# Patient Record
Sex: FEMALE | Race: WHITE | NOT HISPANIC OR LATINO
[De-identification: names, ages, dates, MRNs, and addresses within clinical notes are randomized per-mention and may not be internally consistent; named-entity substitution may affect disease eponyms.]

---

## 2017-01-04 ENCOUNTER — TRANSCRIPTION ENCOUNTER (OUTPATIENT)
Age: 27
End: 2017-01-04

## 2017-01-04 ENCOUNTER — OTHER (OUTPATIENT)
Age: 27
End: 2017-01-04

## 2018-01-09 ENCOUNTER — APPOINTMENT (OUTPATIENT)
Dept: INTERNAL MEDICINE | Facility: CLINIC | Age: 28
End: 2018-01-09

## 2018-01-16 ENCOUNTER — APPOINTMENT (OUTPATIENT)
Dept: INTERNAL MEDICINE | Facility: CLINIC | Age: 28
End: 2018-01-16
Payer: COMMERCIAL

## 2018-01-16 VITALS
DIASTOLIC BLOOD PRESSURE: 86 MMHG | BODY MASS INDEX: 21.14 KG/M2 | WEIGHT: 112 LBS | HEART RATE: 87 BPM | TEMPERATURE: 98.4 F | HEIGHT: 61 IN | SYSTOLIC BLOOD PRESSURE: 120 MMHG | OXYGEN SATURATION: 96 %

## 2018-01-16 PROCEDURE — 99214 OFFICE O/P EST MOD 30 MIN: CPT

## 2018-01-19 ENCOUNTER — OTHER (OUTPATIENT)
Age: 28
End: 2018-01-19

## 2018-04-10 ENCOUNTER — APPOINTMENT (OUTPATIENT)
Dept: INTERNAL MEDICINE | Facility: CLINIC | Age: 28
End: 2018-04-10
Payer: COMMERCIAL

## 2018-04-10 VITALS
SYSTOLIC BLOOD PRESSURE: 123 MMHG | HEART RATE: 85 BPM | WEIGHT: 110 LBS | DIASTOLIC BLOOD PRESSURE: 78 MMHG | BODY MASS INDEX: 20.77 KG/M2 | HEIGHT: 61 IN | OXYGEN SATURATION: 98 % | TEMPERATURE: 99 F

## 2018-04-10 LAB
BASOPHILS # BLD AUTO: 0.02 K/UL
BASOPHILS NFR BLD AUTO: 0.5 %
EOSINOPHIL # BLD AUTO: 0.05 K/UL
EOSINOPHIL NFR BLD AUTO: 1.3 %
HCT VFR BLD CALC: 37.9 %
HGB BLD-MCNC: 13 G/DL
IMM GRANULOCYTES NFR BLD AUTO: 0 %
LYMPHOCYTES # BLD AUTO: 1.28 K/UL
LYMPHOCYTES NFR BLD AUTO: 32.3 %
MAN DIFF?: NORMAL
MCHC RBC-ENTMCNC: 29.7 PG
MCHC RBC-ENTMCNC: 34.3 GM/DL
MCV RBC AUTO: 86.5 FL
MONOCYTES # BLD AUTO: 0.39 K/UL
MONOCYTES NFR BLD AUTO: 9.8 %
NEUTROPHILS # BLD AUTO: 2.22 K/UL
NEUTROPHILS NFR BLD AUTO: 56.1 %
PLATELET # BLD AUTO: 207 K/UL
RBC # BLD: 4.38 M/UL
RBC # FLD: 12.9 %
WBC # FLD AUTO: 3.96 K/UL

## 2018-04-10 PROCEDURE — 99395 PREV VISIT EST AGE 18-39: CPT | Mod: 25

## 2018-04-10 PROCEDURE — 36415 COLL VENOUS BLD VENIPUNCTURE: CPT

## 2018-04-10 RX ORDER — HYDROCODONE BITARTRATE AND HOMATROPINE METHYLBROMIDE 5; 1.5 MG/5ML; MG/5ML
5-1.5 SYRUP ORAL
Qty: 1 | Refills: 0 | Status: DISCONTINUED | COMMUNITY
Start: 2018-01-16 | End: 2018-04-10

## 2018-04-10 RX ORDER — AZITHROMYCIN 250 MG/1
250 TABLET, FILM COATED ORAL
Qty: 6 | Refills: 0 | Status: DISCONTINUED | COMMUNITY
Start: 2018-01-16 | End: 2018-04-10

## 2018-04-11 LAB
25(OH)D3 SERPL-MCNC: 14.2 NG/ML
ALBUMIN SERPL ELPH-MCNC: 4.7 G/DL
ALP BLD-CCNC: 46 U/L
ALT SERPL-CCNC: 11 U/L
ANION GAP SERPL CALC-SCNC: 15 MMOL/L
AST SERPL-CCNC: 20 U/L
BILIRUB SERPL-MCNC: 1 MG/DL
BUN SERPL-MCNC: 11 MG/DL
CALCIUM SERPL-MCNC: 9.7 MG/DL
CHLORIDE SERPL-SCNC: 101 MMOL/L
CHOLEST SERPL-MCNC: 175 MG/DL
CHOLEST/HDLC SERPL: 2.8 RATIO
CO2 SERPL-SCNC: 23 MMOL/L
CREAT SERPL-MCNC: 0.75 MG/DL
FOLATE SERPL-MCNC: 16.7 NG/ML
GLUCOSE SERPL-MCNC: 92 MG/DL
HCV AB SER QL: NONREACTIVE
HCV S/CO RATIO: 0.09 S/CO
HDLC SERPL-MCNC: 62 MG/DL
HIV1+2 AB SPEC QL IA.RAPID: NONREACTIVE
LDLC SERPL CALC-MCNC: 104 MG/DL
POTASSIUM SERPL-SCNC: 4.2 MMOL/L
PROT SERPL-MCNC: 7.4 G/DL
SODIUM SERPL-SCNC: 139 MMOL/L
TRIGL SERPL-MCNC: 44 MG/DL
TSH SERPL-ACNC: 2.66 UIU/ML
VIT B12 SERPL-MCNC: 246 PG/ML

## 2018-05-08 ENCOUNTER — RESULT REVIEW (OUTPATIENT)
Age: 28
End: 2018-05-08

## 2018-10-31 ENCOUNTER — APPOINTMENT (OUTPATIENT)
Dept: INTERNAL MEDICINE | Facility: CLINIC | Age: 28
End: 2018-10-31
Payer: COMMERCIAL

## 2018-10-31 VITALS
HEART RATE: 81 BPM | DIASTOLIC BLOOD PRESSURE: 81 MMHG | HEIGHT: 61 IN | TEMPERATURE: 98.1 F | WEIGHT: 116 LBS | OXYGEN SATURATION: 97 % | BODY MASS INDEX: 21.9 KG/M2 | SYSTOLIC BLOOD PRESSURE: 121 MMHG

## 2018-10-31 DIAGNOSIS — D73.9 DISEASE OF SPLEEN, UNSPECIFIED: ICD-10-CM

## 2018-10-31 DIAGNOSIS — G43.909 MIGRAINE, UNSPECIFIED, NOT INTRACTABLE, W/OUT STATUS MIGRAINOSUS: ICD-10-CM

## 2018-10-31 PROCEDURE — 99214 OFFICE O/P EST MOD 30 MIN: CPT

## 2018-10-31 NOTE — HISTORY OF PRESENT ILLNESS
[de-identified] : 28 yr old last seen in spring, complaining of headaches. She has them on and off, had a severe one a week ago with aura, light sensitivity and vomiting. 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 Lengthy URI over winter. Wondering about when to plan pregnancies and needs new OB/GYN.  Working hard, Feels good.

## 2019-02-04 ENCOUNTER — APPOINTMENT (OUTPATIENT)
Dept: INTERNAL MEDICINE | Facility: CLINIC | Age: 29
End: 2019-02-04
Payer: COMMERCIAL

## 2019-02-04 VITALS
HEIGHT: 61 IN | TEMPERATURE: 97.8 F | SYSTOLIC BLOOD PRESSURE: 110 MMHG | HEART RATE: 112 BPM | DIASTOLIC BLOOD PRESSURE: 72 MMHG | OXYGEN SATURATION: 97 % | BODY MASS INDEX: 21.17 KG/M2 | WEIGHT: 112.13 LBS

## 2019-02-04 DIAGNOSIS — J06.9 ACUTE UPPER RESPIRATORY INFECTION, UNSPECIFIED: ICD-10-CM

## 2019-02-04 DIAGNOSIS — R53.81 OTHER MALAISE: ICD-10-CM

## 2019-02-04 DIAGNOSIS — R10.9 UNSPECIFIED ABDOMINAL PAIN: ICD-10-CM

## 2019-02-04 DIAGNOSIS — J20.8 ACUTE BRONCHITIS DUE TO OTHER SPECIFIED ORGANISMS: ICD-10-CM

## 2019-02-04 DIAGNOSIS — R21 RASH AND OTHER NONSPECIFIC SKIN ERUPTION: ICD-10-CM

## 2019-02-04 DIAGNOSIS — Z87.898 PERSONAL HISTORY OF OTHER SPECIFIED CONDITIONS: ICD-10-CM

## 2019-02-04 DIAGNOSIS — R53.83 OTHER MALAISE: ICD-10-CM

## 2019-02-04 LAB
FLUAV SPEC QL CULT: NORMAL
FLUBV AG SPEC QL IA: NORMAL
S PYO AG SPEC QL IA: NORMAL

## 2019-02-04 PROCEDURE — 87804 INFLUENZA ASSAY W/OPTIC: CPT | Mod: QW

## 2019-02-04 PROCEDURE — 87880 STREP A ASSAY W/OPTIC: CPT | Mod: QW

## 2019-02-04 PROCEDURE — 99214 OFFICE O/P EST MOD 30 MIN: CPT | Mod: 25

## 2019-02-04 RX ORDER — PRENATAL VIT/IRON FUM/FOLIC AC 28MG-0.8MG
28-0.8 TABLET ORAL DAILY
Qty: 90 | Refills: 3 | Status: COMPLETED | OUTPATIENT
Start: 2017-01-04 | End: 2019-02-04

## 2019-02-04 NOTE — ASSESSMENT
[FreeTextEntry1] : 29 yO female is here with a viral upper respiratory syndrome.\par Symptomatic relief advised. \par Cheratussin prescribed. [place].\par NO indication for ABX at this time.

## 2019-02-04 NOTE — PHYSICAL EXAM
[No Acute Distress] : no acute distress [Well Nourished] : well nourished [Well Developed] : well developed [Well-Appearing] : well-appearing [Normal Voice/Communication] : normal voice/communication [Normal Sclera/Conjunctiva] : normal sclera/conjunctiva [PERRL] : pupils equal round and reactive to light [Normal Outer Ear/Nose] : the outer ears and nose were normal in appearance [Normal Oropharynx] : the oropharynx was normal [Normal TMs] : both tympanic membranes were normal [Normal Nasal Mucosa] : the nasal mucosa was normal [No Lymphadenopathy] : no lymphadenopathy [Thyroid Normal, No Nodules] : the thyroid was normal and there were no nodules present [No Respiratory Distress] : no respiratory distress  [Clear to Auscultation] : lungs were clear to auscultation bilaterally [No Accessory Muscle Use] : no accessory muscle use [Normal Rate] : normal rate  [No Edema] : there was no peripheral edema [Normal Supraclavicular Nodes] : no supraclavicular lymphadenopathy [Normal Posterior Cervical Nodes] : no posterior cervical lymphadenopathy [Normal Anterior Cervical Nodes] : no anterior cervical lymphadenopathy [Normal Affect] : the affect was normal [Alert and Oriented x3] : oriented to person, place, and time

## 2019-02-04 NOTE — HISTORY OF PRESENT ILLNESS
[FreeTextEntry8] : 27 y/o female, pt of Dr Arana, is here for 24 hours of upper respiratory symptoms. Last week, she had one day of malaise but got better and was fine ovre the weekend. Then, last night after the super bowl. she had severe pharyngitis and didn't sleep well.\par This morning, she had nasal congestion.\par She had chills but no fever-Tm 98.8. SHe has developed back ache and myalgias. She has not taken anything for symptoms relief. \par

## 2019-02-04 NOTE — REVIEW OF SYSTEMS
[Chills] : chills [Nasal Discharge] : nasal discharge [Sore Throat] : sore throat [Postnasal Drip] : postnasal drip [Cough] : cough [Muscle Pain] : muscle pain [Negative] : Gastrointestinal [Fever] : no fever [Night Sweats] : no night sweats [Earache] : no earache [Hearing Loss] : no hearing loss [Nosebleed] : no nosebleeds [Hoarseness] : no hoarseness [Dyspnea on Exertion] : no dyspnea on exertion [Headache] : no headache [Swollen Glands] : no swollen glands

## 2019-05-08 ENCOUNTER — TRANSCRIPTION ENCOUNTER (OUTPATIENT)
Age: 29
End: 2019-05-08

## 2019-07-16 ENCOUNTER — RESULT REVIEW (OUTPATIENT)
Age: 29
End: 2019-07-16

## 2019-07-28 RX ORDER — CODEINE PHOSPHATE AND GUAIFENESIN 10; 100 MG/5ML; MG/5ML
100-10 LIQUID ORAL EVERY 8 HOURS
Qty: 1 | Refills: 0 | Status: DISCONTINUED | COMMUNITY
Start: 2019-02-04 | End: 2019-07-28

## 2019-07-28 RX ORDER — SUMATRIPTAN 25 MG/1
25 TABLET, FILM COATED ORAL
Qty: 10 | Refills: 5 | Status: DISCONTINUED | COMMUNITY
Start: 2018-10-31 | End: 2019-07-28

## 2019-07-28 NOTE — PHYSICAL EXAM
[No Acute Distress] : no acute distress [Well Nourished] : well nourished [Well-Appearing] : well-appearing [Well Developed] : well developed [Normal Sclera/Conjunctiva] : normal sclera/conjunctiva [EOMI] : extraocular movements intact [PERRL] : pupils equal round and reactive to light [No JVD] : no jugular venous distention [Normal Oropharynx] : the oropharynx was normal [Normal Outer Ear/Nose] : the outer ears and nose were normal in appearance [Thyroid Normal, No Nodules] : the thyroid was normal and there were no nodules present [Supple] : supple [No Lymphadenopathy] : no lymphadenopathy [No Accessory Muscle Use] : no accessory muscle use [Clear to Auscultation] : lungs were clear to auscultation bilaterally [No Respiratory Distress] : no respiratory distress  [Normal Rate] : normal rate  [Regular Rhythm] : with a regular rhythm [Normal S1, S2] : normal S1 and S2 [No Murmur] : no murmur heard [No Carotid Bruits] : no carotid bruits [No Abdominal Bruit] : a ~M bruit was not heard ~T in the abdomen [No Varicosities] : no varicosities [No Edema] : there was no peripheral edema [Pedal Pulses Present] : the pedal pulses are present [Soft] : abdomen soft [No Palpable Aorta] : no palpable aorta [No Extremity Clubbing/Cyanosis] : no extremity clubbing/cyanosis [No Masses] : no abdominal mass palpated [Non Tender] : non-tender [Non-distended] : non-distended [Normal Posterior Cervical Nodes] : no posterior cervical lymphadenopathy [No HSM] : no HSM [Normal Bowel Sounds] : normal bowel sounds [Normal Anterior Cervical Nodes] : no anterior cervical lymphadenopathy [No Spinal Tenderness] : no spinal tenderness [No CVA Tenderness] : no CVA  tenderness [No Rash] : no rash [Coordination Grossly Intact] : coordination grossly intact [No Joint Swelling] : no joint swelling [Grossly Normal Strength/Tone] : grossly normal strength/tone [Normal Gait] : normal gait [Deep Tendon Reflexes (DTR)] : deep tendon reflexes were 2+ and symmetric [No Focal Deficits] : no focal deficits [Normal Affect] : the affect was normal [Normal Insight/Judgement] : insight and judgment were intact

## 2019-07-29 ENCOUNTER — APPOINTMENT (OUTPATIENT)
Dept: INTERNAL MEDICINE | Facility: CLINIC | Age: 29
End: 2019-07-29
Payer: SELF-PAY

## 2019-07-29 VITALS
BODY MASS INDEX: 21.14 KG/M2 | HEIGHT: 61 IN | WEIGHT: 112 LBS | TEMPERATURE: 99.3 F | HEART RATE: 59 BPM | OXYGEN SATURATION: 98 % | SYSTOLIC BLOOD PRESSURE: 110 MMHG | DIASTOLIC BLOOD PRESSURE: 84 MMHG

## 2019-07-29 DIAGNOSIS — Z23 ENCOUNTER FOR IMMUNIZATION: ICD-10-CM

## 2019-07-29 PROCEDURE — 99402 PREV MED CNSL INDIV APPRX 30: CPT | Mod: 25

## 2019-07-29 PROCEDURE — 99213 OFFICE O/P EST LOW 20 MIN: CPT | Mod: 25

## 2019-07-29 PROCEDURE — 90471 IMMUNIZATION ADMIN: CPT

## 2019-07-29 PROCEDURE — 90632 HEPA VACCINE ADULT IM: CPT

## 2019-07-29 NOTE — ASSESSMENT
[FreeTextEntry1] : 28 yr old woman who has traveled to [place] and [place] before, now traveling to [place] and [place] on holiday with her .  Familiar with malarial prophylaxis and has tolerated doxy before.  \par \par Advised on safe food/ water / hygiene practices during travel/ sunscreen etc. \par Advised on taking Malarone\par \par Advised on when to take cipro/ prn should she develop diarrhea\par Declines typhoid vaccine, will take oral Typhim\par \par RTC 6 mo for repeat hep A vaccine\par CPE in interim, may check immunity then

## 2019-07-29 NOTE — HISTORY OF PRESENT ILLNESS
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

## 2019-08-01 ENCOUNTER — TRANSCRIPTION ENCOUNTER (OUTPATIENT)
Age: 29
End: 2019-08-01

## 2020-07-21 ENCOUNTER — RESULT REVIEW (OUTPATIENT)
Age: 30
End: 2020-07-21

## 2020-12-08 ENCOUNTER — APPOINTMENT (OUTPATIENT)
Dept: INTERNAL MEDICINE | Facility: CLINIC | Age: 30
End: 2020-12-08

## 2020-12-09 ENCOUNTER — APPOINTMENT (OUTPATIENT)
Dept: INTERNAL MEDICINE | Facility: CLINIC | Age: 30
End: 2020-12-09
Payer: COMMERCIAL

## 2020-12-09 VITALS
SYSTOLIC BLOOD PRESSURE: 121 MMHG | DIASTOLIC BLOOD PRESSURE: 75 MMHG | HEIGHT: 61 IN | WEIGHT: 115 LBS | BODY MASS INDEX: 21.71 KG/M2 | TEMPERATURE: 97.5 F | OXYGEN SATURATION: 98 % | HEART RATE: 78 BPM

## 2020-12-09 PROCEDURE — 99072 ADDL SUPL MATRL&STAF TM PHE: CPT

## 2020-12-09 PROCEDURE — 99213 OFFICE O/P EST LOW 20 MIN: CPT

## 2020-12-21 PROBLEM — J06.9 ACUTE UPPER RESPIRATORY INFECTION: Status: RESOLVED | Noted: 2019-02-04 | Resolved: 2020-12-21

## 2020-12-28 ENCOUNTER — TRANSCRIPTION ENCOUNTER (OUTPATIENT)
Age: 30
End: 2020-12-28

## 2020-12-29 ENCOUNTER — APPOINTMENT (OUTPATIENT)
Dept: INTERNAL MEDICINE | Facility: CLINIC | Age: 30
End: 2020-12-29
Payer: COMMERCIAL

## 2020-12-29 VITALS
DIASTOLIC BLOOD PRESSURE: 74 MMHG | SYSTOLIC BLOOD PRESSURE: 122 MMHG | TEMPERATURE: 98.5 F | WEIGHT: 115 LBS | HEART RATE: 81 BPM | HEIGHT: 61 IN | OXYGEN SATURATION: 97 % | BODY MASS INDEX: 21.71 KG/M2

## 2020-12-29 DIAGNOSIS — Z71.84 ENC FOR HEALTH COUNSELING RELATED TO TRAVEL: ICD-10-CM

## 2020-12-29 DIAGNOSIS — J03.90 ACUTE TONSILLITIS, UNSPECIFIED: ICD-10-CM

## 2020-12-29 DIAGNOSIS — J02.9 ACUTE TONSILLITIS, UNSPECIFIED: ICD-10-CM

## 2020-12-29 DIAGNOSIS — I88.9 NONSPECIFIC LYMPHADENITIS, UNSPECIFIED: ICD-10-CM

## 2020-12-29 PROCEDURE — 99214 OFFICE O/P EST MOD 30 MIN: CPT | Mod: 25

## 2020-12-29 PROCEDURE — 36415 COLL VENOUS BLD VENIPUNCTURE: CPT

## 2020-12-29 PROCEDURE — 99072 ADDL SUPL MATRL&STAF TM PHE: CPT

## 2020-12-29 RX ORDER — CIPROFLOXACIN HYDROCHLORIDE 500 MG/1
500 TABLET, FILM COATED ORAL
Qty: 20 | Refills: 0 | Status: COMPLETED | COMMUNITY
Start: 2019-07-29 | End: 2020-12-29

## 2020-12-29 RX ORDER — ATOVAQUONE AND PROGUANIL HYDROCHLORIDE 250; 100 MG/1; MG/1
250-100 TABLET, FILM COATED ORAL DAILY
Qty: 30 | Refills: 0 | Status: COMPLETED | COMMUNITY
Start: 2019-07-29 | End: 2020-12-29

## 2020-12-29 RX ORDER — CLARITHROMYCIN 500 MG/1
500 TABLET, FILM COATED ORAL
Qty: 14 | Refills: 0 | Status: COMPLETED | COMMUNITY
Start: 2020-12-09 | End: 2020-12-29

## 2020-12-29 NOTE — HISTORY OF PRESENT ILLNESS
[FreeTextEntry8] : 31 y/o female, patient of Dr Mendez's, presents with a "lump" in her right neck. SHe feels pulling there when she swallows and it's slightly tender. It is hard and hasn't gone away.\par Pt saw Dr Mendez at the beginning of the month for presumed tonsilitis. SHe was given a week of Biaxin but didn't complete the course bc it didn't help. She had COVID In the spring with continued elevated ABs as well as a recent negative COVID PCR.\par Currently, no fevers/chills/rashes/night swears.\par \par

## 2020-12-29 NOTE — ASSESSMENT
[FreeTextEntry1] : I am not sure if this is cervical adenopathy or salivary gland enlargment. Obtain sono. Obtain labs for EBV, LDH,CBC, HIV. While results pending, treat with sour sucking candy, plenty of liquids.\par

## 2020-12-29 NOTE — PHYSICAL EXAM
[No Acute Distress] : no acute distress [Normal Sclera/Conjunctiva] : normal sclera/conjunctiva [Normal Outer Ear/Nose] : the outer ears and nose were normal in appearance [Normal Oropharynx] : the oropharynx was normal [Supple] : supple [Thyroid Normal, No Nodules] : the thyroid was normal and there were no nodules present [Normal Rate] : normal rate  [No Edema] : there was no peripheral edema [Normal Supraclavicular Nodes] : no supraclavicular lymphadenopathy [Normal Posterior Cervical Nodes] : no posterior cervical lymphadenopathy [No Rash] : no rash [Coordination Grossly Intact] : coordination grossly intact [Normal Affect] : the affect was normal [Alert and Oriented x3] : oriented to person, place, and time [de-identified] : 1 cm non-mobile hard mass in anterior cervical area [de-identified] : see neck exam

## 2020-12-30 LAB
ALBUMIN SERPL ELPH-MCNC: 4.7 G/DL
ALP BLD-CCNC: 50 U/L
ALT SERPL-CCNC: 9 U/L
ANION GAP SERPL CALC-SCNC: 15 MMOL/L
AST SERPL-CCNC: 15 U/L
BASOPHILS # BLD AUTO: 0.03 K/UL
BASOPHILS NFR BLD AUTO: 0.5 %
BILIRUB SERPL-MCNC: 0.7 MG/DL
BUN SERPL-MCNC: 19 MG/DL
CALCIUM SERPL-MCNC: 9.1 MG/DL
CHLORIDE SERPL-SCNC: 102 MMOL/L
CO2 SERPL-SCNC: 24 MMOL/L
CREAT SERPL-MCNC: 0.86 MG/DL
EOSINOPHIL # BLD AUTO: 0.07 K/UL
EOSINOPHIL NFR BLD AUTO: 1.3 %
GLUCOSE SERPL-MCNC: 54 MG/DL
HCT VFR BLD CALC: 38.7 %
HGB BLD-MCNC: 12.5 G/DL
HIV1+2 AB SPEC QL IA.RAPID: NONREACTIVE
IMM GRANULOCYTES NFR BLD AUTO: 0.2 %
LDH SERPL-CCNC: 167 U/L
LYMPHOCYTES # BLD AUTO: 1.8 K/UL
LYMPHOCYTES NFR BLD AUTO: 32.5 %
MAN DIFF?: NORMAL
MCHC RBC-ENTMCNC: 29.6 PG
MCHC RBC-ENTMCNC: 32.3 GM/DL
MCV RBC AUTO: 91.5 FL
MONOCYTES # BLD AUTO: 0.47 K/UL
MONOCYTES NFR BLD AUTO: 8.5 %
NEUTROPHILS # BLD AUTO: 3.15 K/UL
NEUTROPHILS NFR BLD AUTO: 57 %
PLATELET # BLD AUTO: 233 K/UL
POTASSIUM SERPL-SCNC: 4 MMOL/L
PROT SERPL-MCNC: 6.9 G/DL
RBC # BLD: 4.23 M/UL
RBC # FLD: 12.6 %
SODIUM SERPL-SCNC: 141 MMOL/L
WBC # FLD AUTO: 5.53 K/UL

## 2020-12-31 LAB — HETEROPH AB SER QL: NEGATIVE

## 2021-01-02 ENCOUNTER — OUTPATIENT (OUTPATIENT)
Dept: OUTPATIENT SERVICES | Facility: HOSPITAL | Age: 31
LOS: 1 days | End: 2021-01-02
Payer: COMMERCIAL

## 2021-01-02 ENCOUNTER — APPOINTMENT (OUTPATIENT)
Dept: ULTRASOUND IMAGING | Facility: HOSPITAL | Age: 31
End: 2021-01-02

## 2021-01-02 PROCEDURE — 76536 US EXAM OF HEAD AND NECK: CPT

## 2021-01-02 PROCEDURE — 76536 US EXAM OF HEAD AND NECK: CPT | Mod: 26

## 2021-01-04 ENCOUNTER — TRANSCRIPTION ENCOUNTER (OUTPATIENT)
Age: 31
End: 2021-01-04

## 2021-01-04 DIAGNOSIS — R59.1 GENERALIZED ENLARGED LYMPH NODES: ICD-10-CM

## 2021-01-05 ENCOUNTER — TRANSCRIPTION ENCOUNTER (OUTPATIENT)
Age: 31
End: 2021-01-05

## 2021-01-06 ENCOUNTER — APPOINTMENT (OUTPATIENT)
Dept: CT IMAGING | Facility: HOSPITAL | Age: 31
End: 2021-01-06

## 2021-01-06 ENCOUNTER — OUTPATIENT (OUTPATIENT)
Dept: OUTPATIENT SERVICES | Facility: HOSPITAL | Age: 31
LOS: 1 days | End: 2021-01-06
Payer: COMMERCIAL

## 2021-01-06 PROCEDURE — 70491 CT SOFT TISSUE NECK W/DYE: CPT | Mod: 26

## 2021-01-06 PROCEDURE — 70491 CT SOFT TISSUE NECK W/DYE: CPT

## 2021-01-25 ENCOUNTER — APPOINTMENT (OUTPATIENT)
Dept: INTERNAL MEDICINE | Facility: CLINIC | Age: 31
End: 2021-01-25
Payer: COMMERCIAL

## 2021-01-25 PROCEDURE — 99213 OFFICE O/P EST LOW 20 MIN: CPT | Mod: 95

## 2021-01-25 NOTE — ASSESSMENT
[FreeTextEntry1] : Pt with likely acute viral bronchitis-unilklely COVID.\par Obtain CXR. Start cough suppressant and short course of steroids.\par

## 2021-01-25 NOTE — HISTORY OF PRESENT ILLNESS
[FreeTextEntry8] : 31 y/o female presents with URI x 3 weeks.\par Now, with worsening cough. Chest and abds hurt from coughing.\par Feels deep and "producitve" but nothing "comes up".\par Taking Robitussin and Nyquil.\par Not really having sinus infection symptoms.\par \par Recently tested for COVID AB and was positive.\par Recently seem by me for LAD which occurred right before URI symtpoms started.\par \par Had first COVID vaccine.\par \par

## 2021-01-25 NOTE — REVIEW OF SYSTEMS
[Shortness Of Breath] : no shortness of breath [Wheezing] : no wheezing [Cough] : cough [Negative] : Heme/Lymph

## 2021-01-25 NOTE — PHYSICAL EXAM
[No Acute Distress] : no acute distress [Normal Sclera/Conjunctiva] : normal sclera/conjunctiva [Normal Outer Ear/Nose] : the outer ears and nose were normal in appearance [No JVD] : no jugular venous distention [No Rash] : no rash [Normal Affect] : the affect was normal

## 2021-02-08 ENCOUNTER — APPOINTMENT (OUTPATIENT)
Dept: INTERNAL MEDICINE | Facility: CLINIC | Age: 31
End: 2021-02-08
Payer: COMMERCIAL

## 2021-02-08 PROCEDURE — 99422 OL DIG E/M SVC 11-20 MIN: CPT

## 2021-02-08 RX ORDER — BENZONATATE 100 MG/1
100 CAPSULE ORAL 3 TIMES DAILY
Qty: 60 | Refills: 0 | Status: COMPLETED | COMMUNITY
Start: 2021-01-25 | End: 2021-02-08

## 2021-02-08 RX ORDER — PREDNISONE 20 MG/1
20 TABLET ORAL
Qty: 5 | Refills: 0 | Status: COMPLETED | COMMUNITY
Start: 2021-01-25 | End: 2021-02-08

## 2021-05-26 ENCOUNTER — TRANSCRIPTION ENCOUNTER (OUTPATIENT)
Age: 31
End: 2021-05-26

## 2021-07-14 ENCOUNTER — APPOINTMENT (OUTPATIENT)
Dept: INTERNAL MEDICINE | Facility: CLINIC | Age: 31
End: 2021-07-14
Payer: COMMERCIAL

## 2021-07-14 VITALS
TEMPERATURE: 96.9 F | SYSTOLIC BLOOD PRESSURE: 111 MMHG | DIASTOLIC BLOOD PRESSURE: 75 MMHG | HEIGHT: 61 IN | BODY MASS INDEX: 20.58 KG/M2 | HEART RATE: 71 BPM | OXYGEN SATURATION: 98 % | WEIGHT: 109 LBS

## 2021-07-14 DIAGNOSIS — M25.511 PAIN IN RIGHT SHOULDER: ICD-10-CM

## 2021-07-14 PROCEDURE — 99072 ADDL SUPL MATRL&STAF TM PHE: CPT

## 2021-07-14 PROCEDURE — 99214 OFFICE O/P EST MOD 30 MIN: CPT

## 2021-07-14 RX ORDER — CLARITHROMYCIN 500 MG/1
500 TABLET, FILM COATED ORAL
Qty: 14 | Refills: 0 | Status: DISCONTINUED | COMMUNITY
Start: 2021-02-08 | End: 2021-07-14

## 2021-07-22 ENCOUNTER — OUTPATIENT (OUTPATIENT)
Dept: OUTPATIENT SERVICES | Facility: HOSPITAL | Age: 31
LOS: 1 days | End: 2021-07-22
Payer: COMMERCIAL

## 2021-07-22 ENCOUNTER — RESULT REVIEW (OUTPATIENT)
Age: 31
End: 2021-07-22

## 2021-07-22 ENCOUNTER — APPOINTMENT (OUTPATIENT)
Dept: ORTHOPEDIC SURGERY | Facility: CLINIC | Age: 31
End: 2021-07-22
Payer: COMMERCIAL

## 2021-07-22 VITALS
BODY MASS INDEX: 20.58 KG/M2 | DIASTOLIC BLOOD PRESSURE: 80 MMHG | HEART RATE: 97 BPM | SYSTOLIC BLOOD PRESSURE: 115 MMHG | HEIGHT: 61 IN | WEIGHT: 109 LBS | TEMPERATURE: 98.6 F | OXYGEN SATURATION: 67 %

## 2021-07-22 DIAGNOSIS — M75.41 IMPINGEMENT SYNDROME OF RIGHT SHOULDER: ICD-10-CM

## 2021-07-22 DIAGNOSIS — M75.51 BURSITIS OF RIGHT SHOULDER: ICD-10-CM

## 2021-07-22 PROCEDURE — 99072 ADDL SUPL MATRL&STAF TM PHE: CPT

## 2021-07-22 PROCEDURE — 99203 OFFICE O/P NEW LOW 30 MIN: CPT

## 2021-07-22 PROCEDURE — 73030 X-RAY EXAM OF SHOULDER: CPT

## 2021-07-22 PROCEDURE — 73030 X-RAY EXAM OF SHOULDER: CPT | Mod: 26,RT

## 2021-07-22 NOTE — PHYSICAL EXAM
[de-identified] : General: Well-nourished, well-developed, alert, and in no acute distress.\par Head: Normocephalic.\par Eyes: Pupils equal round reactive to light and accommodation, extraocular muscles intact, normal sclera.\par Nose: No nasal discharge.\par Cardiac: Regular rate. Extremities are warm and well perfused. Distal pulses are symmetric bilaterally.\par Respiratory: No labored breathing.\par Extremities: Sensation is intact distally bilaterally.  Distal pulses are symmetric bilaterally\par Lymphatic: No regional lymphadenopathy, no lymphedema\par Neurologic: No focal deficits\par Skin: Normal skin color, texture, and turgor\par Psychiatric: Normal affect\par MSK: as noted above/below\par \par \par \par RIGHT SHOULDER:\par  \par Inspection:no bruising, rash, erythema, deformity\par Joint Effusion:no\par ROM:normal Forward Flexion, Abduction , Internal Rotation WITH SOME TIGHTNESS: , External Rotation \par Palpation: NO AC joint pain, Bicipital Groove Pain , GH joint pain , Clavicle pain , GT pain \par Distal Pulses:normal\par Upper Extremity Reflexes:2+\par Shoulder Strength: 5/5 \par Upper Extremity Sensation: normal\par \par Special Tests:\par Empty Can: Negative \par Lift-Off Test: MILDLY PAINFUL AT ANTERIOR CAPSULE\par O'Briens: Negative\par Cross Arm: POSITIVE\par Neer: POSITIVE\par Matthews: POSITIVE\par Speed: Negative\par Apprehension:Negative\par \par LEFT SHOULDER:\par  \par Inspection:no bruising, rash, erythema, deformity\par Joint Effusion:no\par ROM:normal Forward Flexion, Abduction , Internal Rotation: , External Rotation \par Palpation: NO AC joint pain, Bicipital Groove Pain , GH joint pain , Clavicle pain , GT pain \par Distal Pulses:normal\par Upper Extremity Reflexes:2+\par Shoulder Strength: 5/5 \par Upper Extremity Sensation: normal\par \par Special Tests:\par Empty Can: Negative \par Lift-Off Test: Negative \par O'Briens: Negative\par Cross Arm: Negative\par Neer: Negative\par Matthews: Negative\par Speed: Negative\par Apprehension:Negative\par \par RIGHT  ELBOW:\par \par Inspection: no rash, erythema, deformity\par Joint Effusion: no\par ROM: elbow extension 0 , elbow flexion ~135 , supination ~70, pronation ~70\par Palpation: NO pain at lateral epicondyle,  medial epicondyle,  distal triceps,  distal biceps,  proximal biceps, olecranon\par Distal Pulses: normal\par Upper Extremity Sensation: normal\par Upper Extremity Reflexes: normal\par \par Special Tests:\par Resisted wrist extension: MILDLY POSITIVE\par Resisted middle finger extension: negative\par Resisted wrist flexion: negative\par Resisted supination: negative\par Resisted pronation: negative\par \par \par LEFT  ELBOW:\par \par Inspection: no rash, erythema, deformity\par Joint Effusion: no\par ROM: elbow extension 0 , elbow flexion ~135 , supination ~70, pronation ~70\par Palpation: NO pain at lateral epicondyle,  medial epicondyle,  distal triceps,  distal biceps,  proximal biceps, olecranon\par Distal Pulses: normal\par Upper Extremity Sensation: normal\par Upper Extremity Reflexes: normal\par \par Special Tests:\par Resisted wrist extension: negative\par Resisted middle finger extension: negative\par Resisted wrist flexion: negative\par Resisted supination: negative\par Resisted pronation: negative\par \par \par  [de-identified] : Xray RIGHT Shoulder - Multiple views were reviewed with the patient in detail.  There is no acute fracture or dislocation.  There is no joint effusion.  Joint spaces are preserved. We will await formal radiology reading.\par \par

## 2021-07-22 NOTE — HISTORY OF PRESENT ILLNESS
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  She states that she first started to notice the pain after doing some moving in March.  She denies feeling an acute snap or pop.  She has some radial-sided elbow pain, without radiation to wrist or hand.  Her shoulder pain is typically worse when reaching behind.  She has occasional pain when sleeping on the affected side.  The patient denies red flag symptoms including fever, chills, weight loss, night sweats, bowel/bladder dysfunction, saddle anesthesia.\par \par Pain is rated 3/10, described as dull, improved with rest, worse with overuse. [3] : a current pain level of 3/10

## 2021-07-22 NOTE — DISCUSSION/SUMMARY
[Medication Risks Reviewed] : Medication risks reviewed [de-identified] : The patient is a 30 year old, rhd woman presenting with a 4 month history of chronic, atraumatic right shoulder pain likely secondary to impingement syndrome/subacromial bursitis.  This is in the context of postural imbalance and work-from home.\par \par Imaging/Diagnostics/Medical Records were interpreted and/or reviewed and results were reviewed with the patient in detail.  All questions were answered appropriately.\par \par The patient was counseled on the natural progression of the problem(s) today and potential complications of diagnoses.  The patient was provided reassurance today.\par \par Patient was prescribed a course of physical therapy today.  The patient was also provided some general home exercises.  The patient was counseled on activity modification.\par \par Patient was prescribed a short course of Meloxicam 15mg dailyWM x 10 days.Appropriate use of medication was reviewed with the patient in detail. Risks, benefits, and adverse effects medication were discussed.\par \par Follow-up in 6 weeks after PT.  Pending progress, we discussed MRI vs. injections.\par \par ------------------------------------------------------------------------------------------------------------------\par Patient appreciates and agrees with current plan.\par \par The patient's diagnosis above was evaluated by me, personally.  Diagnostic Testing and treatment options were discussed with the patient in detail.  The risks/benefits/potential complications of diagnostic testing/treatments were described in detail.  \par \par This note was generated using a mixture of manual typing and dragon medical dictation software.  A reasonable effort has been made for proofreading its contents, but typos may still remain.  If there are any questions or points of clarification needed please notify my office.\par \par \par >30 minutes of time was spent on total encounter.  >50% of the visit was spent on face-to-face counseling/coordination of care and medical-decision making for this patient.\par \par

## 2021-07-22 NOTE — REVIEW OF SYSTEMS
[Joint Pain] : joint pain [Joint Stiffness] : joint stiffness [Depression] : depression [Negative] : Heme/Lymph

## 2021-08-27 ENCOUNTER — RESULT REVIEW (OUTPATIENT)
Age: 31
End: 2021-08-27

## 2021-09-02 ENCOUNTER — TRANSCRIPTION ENCOUNTER (OUTPATIENT)
Age: 31
End: 2021-09-02

## 2021-09-27 ENCOUNTER — NON-APPOINTMENT (OUTPATIENT)
Age: 31
End: 2021-09-27

## 2021-10-18 DIAGNOSIS — H92.03 OTALGIA, BILATERAL: ICD-10-CM

## 2021-10-19 ENCOUNTER — APPOINTMENT (OUTPATIENT)
Dept: OTOLARYNGOLOGY | Facility: CLINIC | Age: 31
End: 2021-10-19
Payer: COMMERCIAL

## 2021-10-19 VITALS
BODY MASS INDEX: 21.71 KG/M2 | OXYGEN SATURATION: 100 % | SYSTOLIC BLOOD PRESSURE: 134 MMHG | HEIGHT: 61 IN | TEMPERATURE: 98.3 F | HEART RATE: 68 BPM | DIASTOLIC BLOOD PRESSURE: 92 MMHG | WEIGHT: 115 LBS

## 2021-10-19 PROCEDURE — 31231 NASAL ENDOSCOPY DX: CPT

## 2021-10-19 PROCEDURE — 99203 OFFICE O/P NEW LOW 30 MIN: CPT | Mod: 25

## 2021-10-27 ENCOUNTER — APPOINTMENT (OUTPATIENT)
Dept: CT IMAGING | Facility: HOSPITAL | Age: 31
End: 2021-10-27
Payer: COMMERCIAL

## 2021-10-27 ENCOUNTER — OUTPATIENT (OUTPATIENT)
Dept: OUTPATIENT SERVICES | Facility: HOSPITAL | Age: 31
LOS: 1 days | End: 2021-10-27
Payer: COMMERCIAL

## 2021-10-27 ENCOUNTER — RESULT REVIEW (OUTPATIENT)
Age: 31
End: 2021-10-27

## 2021-10-27 PROCEDURE — 70486 CT MAXILLOFACIAL W/O DYE: CPT

## 2021-10-27 PROCEDURE — 70486 CT MAXILLOFACIAL W/O DYE: CPT | Mod: 26

## 2021-11-04 ENCOUNTER — APPOINTMENT (OUTPATIENT)
Dept: OTOLARYNGOLOGY | Facility: CLINIC | Age: 31
End: 2021-11-04
Payer: COMMERCIAL

## 2021-11-04 VITALS
HEIGHT: 61 IN | WEIGHT: 115 LBS | OXYGEN SATURATION: 97 % | DIASTOLIC BLOOD PRESSURE: 89 MMHG | HEART RATE: 80 BPM | SYSTOLIC BLOOD PRESSURE: 133 MMHG | BODY MASS INDEX: 21.71 KG/M2

## 2021-11-04 PROCEDURE — 92557 COMPREHENSIVE HEARING TEST: CPT

## 2021-11-04 PROCEDURE — 92550 TYMPANOMETRY & REFLEX THRESH: CPT

## 2021-11-04 PROCEDURE — 99213 OFFICE O/P EST LOW 20 MIN: CPT

## 2021-11-04 NOTE — REASON FOR VISIT
[Subsequent Evaluation] : a subsequent evaluation for [FreeTextEntry2] : Persistent Bilateral ear popping.  Right  sided nasal pressure.

## 2021-11-04 NOTE — HISTORY OF PRESENT ILLNESS
[de-identified] : 31 years old female patient with history of Persistent Bilateral ear popping.  Right  sided nasal pressure.  Patient is present today in the office for Sinus Ct-Scan impression.  Deviated Nasal Septum.  Turbinate Hypertrophy.  Sinusitis

## 2021-11-15 NOTE — REASON FOR VISIT
[Initial Evaluation] : an initial evaluation for [FreeTextEntry2] : Rt sided nasal pressure facial pain possible sinusitis

## 2021-11-15 NOTE — CONSULT LETTER
[Dear  ___] : Dear  [unfilled], [Consult Letter:] : I had the pleasure of evaluating your patient, [unfilled]. [Please see my note below.] : Please see my note below. [Consult Closing:] : Thank you very much for allowing me to participate in the care of this patient.  If you have any questions, please do not hesitate to contact me. [Sincerely,] : Sincerely, [FreeTextEntry3] : Kj Gibson MD, FACS\par Professor of Otolaryngology, [facility] [employer] at [facility]\par Director, Center for Sleep Disorders, Department of Otolaryngology, [facility]\par , Head & Neck Service Line, [facility]\par

## 2021-11-15 NOTE — PROCEDURE
[FreeTextEntry6] : Rt sided nasal pressure facial pain possible sinusitis\par DNS large turbs \par Get CT sinuses

## 2021-11-16 ENCOUNTER — TRANSCRIPTION ENCOUNTER (OUTPATIENT)
Age: 31
End: 2021-11-16

## 2021-11-24 ENCOUNTER — TRANSCRIPTION ENCOUNTER (OUTPATIENT)
Age: 31
End: 2021-11-24

## 2021-11-30 DIAGNOSIS — Z01.818 ENCOUNTER FOR OTHER PREPROCEDURAL EXAMINATION: ICD-10-CM

## 2021-12-05 ENCOUNTER — RESULT CHARGE (OUTPATIENT)
Age: 31
End: 2021-12-05

## 2021-12-06 ENCOUNTER — APPOINTMENT (OUTPATIENT)
Dept: INTERNAL MEDICINE | Facility: CLINIC | Age: 31
End: 2021-12-06

## 2021-12-06 DIAGNOSIS — R05.3 CHRONIC COUGH: ICD-10-CM

## 2021-12-06 DIAGNOSIS — Z11.3 ENCOUNTER FOR SCREENING FOR INFECTIONS WITH A PREDOMINANTLY SEXUAL MODE OF TRANSMISSION: ICD-10-CM

## 2021-12-06 DIAGNOSIS — Z00.00 ENCOUNTER FOR GENERAL ADULT MEDICAL EXAMINATION W/OUT ABNORMAL FINDINGS: ICD-10-CM

## 2021-12-06 DIAGNOSIS — E55.9 VITAMIN D DEFICIENCY, UNSPECIFIED: ICD-10-CM

## 2021-12-06 DIAGNOSIS — E53.8 DEFICIENCY OF OTHER SPECIFIED B GROUP VITAMINS: ICD-10-CM

## 2021-12-13 ENCOUNTER — RX RENEWAL (OUTPATIENT)
Age: 31
End: 2021-12-13

## 2022-01-03 ENCOUNTER — APPOINTMENT (OUTPATIENT)
Dept: INTERNAL MEDICINE | Facility: CLINIC | Age: 32
End: 2022-01-03
Payer: COMMERCIAL

## 2022-01-03 VITALS
BODY MASS INDEX: 22.28 KG/M2 | DIASTOLIC BLOOD PRESSURE: 83 MMHG | WEIGHT: 118 LBS | HEIGHT: 61 IN | OXYGEN SATURATION: 96 % | TEMPERATURE: 97.2 F | HEART RATE: 84 BPM | SYSTOLIC BLOOD PRESSURE: 132 MMHG

## 2022-01-03 PROCEDURE — 36415 COLL VENOUS BLD VENIPUNCTURE: CPT

## 2022-01-03 PROCEDURE — 99214 OFFICE O/P EST MOD 30 MIN: CPT | Mod: 25

## 2022-01-04 LAB
ALBUMIN SERPL ELPH-MCNC: 4.7 G/DL
ALP BLD-CCNC: 53 U/L
ALT SERPL-CCNC: 10 U/L
ANION GAP SERPL CALC-SCNC: 14 MMOL/L
APTT BLD: 31.1 SEC
AST SERPL-CCNC: 17 U/L
BASOPHILS # BLD AUTO: 0.06 K/UL
BASOPHILS NFR BLD AUTO: 1 %
BILIRUB SERPL-MCNC: 0.4 MG/DL
BUN SERPL-MCNC: 15 MG/DL
CALCIUM SERPL-MCNC: 9.3 MG/DL
CHLORIDE SERPL-SCNC: 101 MMOL/L
CO2 SERPL-SCNC: 24 MMOL/L
CREAT SERPL-MCNC: 0.62 MG/DL
EOSINOPHIL # BLD AUTO: 0.08 K/UL
EOSINOPHIL NFR BLD AUTO: 1.3 %
GLUCOSE SERPL-MCNC: 87 MG/DL
HCG SERPL-MCNC: <1 MIU/ML
HCT VFR BLD CALC: 39.1 %
HGB BLD-MCNC: 12.8 G/DL
IMM GRANULOCYTES NFR BLD AUTO: 0.2 %
INR PPP: 0.9 RATIO
LYMPHOCYTES # BLD AUTO: 1.63 K/UL
LYMPHOCYTES NFR BLD AUTO: 26.9 %
MAN DIFF?: NORMAL
MCHC RBC-ENTMCNC: 29.5 PG
MCHC RBC-ENTMCNC: 32.7 GM/DL
MCV RBC AUTO: 90.1 FL
MONOCYTES # BLD AUTO: 0.46 K/UL
MONOCYTES NFR BLD AUTO: 7.6 %
NEUTROPHILS # BLD AUTO: 3.81 K/UL
NEUTROPHILS NFR BLD AUTO: 63 %
PLATELET # BLD AUTO: 272 K/UL
POTASSIUM SERPL-SCNC: 4.5 MMOL/L
PROT SERPL-MCNC: 6.7 G/DL
PT BLD: 10.8 SEC
RBC # BLD: 4.34 M/UL
RBC # FLD: 12.6 %
SODIUM SERPL-SCNC: 139 MMOL/L
TSH SERPL-ACNC: 3.62 UIU/ML
WBC # FLD AUTO: 6.05 K/UL

## 2022-01-18 LAB — SARS-COV-2 N GENE NPH QL NAA+PROBE: NOT DETECTED

## 2022-01-28 ENCOUNTER — TRANSCRIPTION ENCOUNTER (OUTPATIENT)
Age: 32
End: 2022-01-28

## 2022-02-09 ENCOUNTER — APPOINTMENT (OUTPATIENT)
Dept: INTERNAL MEDICINE | Facility: CLINIC | Age: 32
End: 2022-02-09
Payer: COMMERCIAL

## 2022-02-09 VITALS
TEMPERATURE: 96.9 F | HEART RATE: 89 BPM | BODY MASS INDEX: 21.71 KG/M2 | DIASTOLIC BLOOD PRESSURE: 80 MMHG | SYSTOLIC BLOOD PRESSURE: 117 MMHG | HEIGHT: 61 IN | OXYGEN SATURATION: 99 % | WEIGHT: 115 LBS

## 2022-02-09 DIAGNOSIS — Z01.818 ENCOUNTER FOR OTHER PREPROCEDURAL EXAMINATION: ICD-10-CM

## 2022-02-09 PROCEDURE — 99214 OFFICE O/P EST MOD 30 MIN: CPT | Mod: 25

## 2022-02-09 PROCEDURE — 36415 COLL VENOUS BLD VENIPUNCTURE: CPT

## 2022-02-09 RX ORDER — AZITHROMYCIN 250 MG/1
250 TABLET, FILM COATED ORAL
Qty: 1 | Refills: 0 | Status: DISCONTINUED | COMMUNITY
Start: 2022-01-19 | End: 2022-02-09

## 2022-02-09 RX ORDER — MELOXICAM 15 MG/1
15 TABLET ORAL
Qty: 30 | Refills: 1 | Status: DISCONTINUED | COMMUNITY
Start: 2021-09-27 | End: 2022-02-09

## 2022-02-09 RX ORDER — METHYLPREDNISOLONE 4 MG/1
4 TABLET ORAL
Qty: 1 | Refills: 0 | Status: DISCONTINUED | COMMUNITY
Start: 2022-01-19 | End: 2022-02-09

## 2022-02-09 RX ORDER — ACETAMINOPHEN AND CODEINE 300; 30 MG/1; MG/1
300-30 TABLET ORAL
Qty: 30 | Refills: 0 | Status: DISCONTINUED | COMMUNITY
Start: 2022-01-19 | End: 2022-02-09

## 2022-02-09 RX ORDER — SALMONELLA TYPHI TY21A 6000000000 [CFU]/1
CAPSULE, COATED ORAL
Qty: 1 | Refills: 0 | Status: DISCONTINUED | COMMUNITY
Start: 2019-07-29 | End: 2022-02-09

## 2022-02-09 RX ORDER — DOCUSATE SODIUM 100 MG/1
100 CAPSULE ORAL TWICE DAILY
Qty: 60 | Refills: 0 | Status: DISCONTINUED | COMMUNITY
Start: 2022-01-19 | End: 2022-02-09

## 2022-02-09 RX ORDER — METHYLPREDNISOLONE 4 MG/1
4 TABLET ORAL
Qty: 21 | Refills: 0 | Status: DISCONTINUED | COMMUNITY
Start: 2021-10-01 | End: 2022-02-09

## 2022-02-09 RX ORDER — NEOMYCIN SULFATE, POLYMYXIN B SULFATE AND HYDROCORTISONE 3.5; 10000; 1 MG/ML; [IU]/ML; MG/ML
3.5-10000-1 SOLUTION AURICULAR (OTIC) 4 TIMES DAILY
Qty: 1 | Refills: 3 | Status: DISCONTINUED | COMMUNITY
Start: 2021-10-18 | End: 2022-02-09

## 2022-02-09 RX ORDER — NORETHINDRONE ACETATE AND ETHINYL ESTRADIOL, ETHINYL ESTRADIOL AND FERROUS FUMARATE 1MG-10(24)
1 MG-10 MCG / KIT ORAL
Qty: 28 | Refills: 0 | Status: DISCONTINUED | COMMUNITY
Start: 2019-07-05 | End: 2022-02-09

## 2022-02-09 RX ORDER — ESCITALOPRAM OXALATE 5 MG/1
5 TABLET ORAL DAILY
Qty: 30 | Refills: 1 | Status: DISCONTINUED | COMMUNITY
Start: 2021-07-14 | End: 2022-02-09

## 2022-02-10 LAB
ALBUMIN SERPL ELPH-MCNC: 4.8 G/DL
ALP BLD-CCNC: 60 U/L
ALT SERPL-CCNC: 13 U/L
ANION GAP SERPL CALC-SCNC: 15 MMOL/L
APTT BLD: 34.9 SEC
AST SERPL-CCNC: 18 U/L
BASOPHILS # BLD AUTO: 0.03 K/UL
BASOPHILS NFR BLD AUTO: 0.8 %
BILIRUB SERPL-MCNC: 0.6 MG/DL
BUN SERPL-MCNC: 17 MG/DL
CALCIUM SERPL-MCNC: 9.5 MG/DL
CHLORIDE SERPL-SCNC: 104 MMOL/L
CO2 SERPL-SCNC: 23 MMOL/L
CREAT SERPL-MCNC: 0.72 MG/DL
EOSINOPHIL # BLD AUTO: 0.06 K/UL
EOSINOPHIL NFR BLD AUTO: 1.5 %
GLUCOSE SERPL-MCNC: 124 MG/DL
HCG SERPL-MCNC: <1 MIU/ML
HCT VFR BLD CALC: 39.1 %
HGB BLD-MCNC: 13.1 G/DL
IMM GRANULOCYTES NFR BLD AUTO: 0.3 %
INR PPP: 1.02 RATIO
LYMPHOCYTES # BLD AUTO: 1.45 K/UL
LYMPHOCYTES NFR BLD AUTO: 37.1 %
MAN DIFF?: NORMAL
MCHC RBC-ENTMCNC: 29.2 PG
MCHC RBC-ENTMCNC: 33.5 GM/DL
MCV RBC AUTO: 87.1 FL
MONOCYTES # BLD AUTO: 0.38 K/UL
MONOCYTES NFR BLD AUTO: 9.7 %
NEUTROPHILS # BLD AUTO: 1.98 K/UL
NEUTROPHILS NFR BLD AUTO: 50.6 %
PLATELET # BLD AUTO: 233 K/UL
POTASSIUM SERPL-SCNC: 4.8 MMOL/L
PROT SERPL-MCNC: 7 G/DL
PT BLD: 12.1 SEC
RBC # BLD: 4.49 M/UL
RBC # FLD: 11.9 %
SODIUM SERPL-SCNC: 142 MMOL/L
WBC # FLD AUTO: 3.91 K/UL

## 2022-02-15 LAB — SARS-COV-2 N GENE NPH QL NAA+PROBE: NOT DETECTED

## 2022-02-16 ENCOUNTER — TRANSCRIPTION ENCOUNTER (OUTPATIENT)
Age: 32
End: 2022-02-16

## 2022-02-16 RX ORDER — DOCUSATE SODIUM 100 MG/1
100 CAPSULE ORAL TWICE DAILY
Qty: 60 | Refills: 0 | Status: ACTIVE | COMMUNITY
Start: 2022-02-16 | End: 1900-01-01

## 2022-02-17 ENCOUNTER — APPOINTMENT (OUTPATIENT)
Dept: OTOLARYNGOLOGY | Facility: AMBULATORY SURGERY CENTER | Age: 32
End: 2022-02-17

## 2022-02-17 ENCOUNTER — OUTPATIENT (OUTPATIENT)
Dept: OUTPATIENT SERVICES | Facility: HOSPITAL | Age: 32
LOS: 1 days | Discharge: HOME | End: 2022-02-17
Payer: COMMERCIAL

## 2022-02-17 PROCEDURE — 31295 NSL/SINS NDSC SURG MAX SINS: CPT | Mod: 50

## 2022-02-17 PROCEDURE — 31240 NSL/SNS NDSC CNCH BULL RESCJ: CPT | Mod: 50

## 2022-02-17 PROCEDURE — 30802 ABLATE INF TURBINATE SUBMUC: CPT

## 2022-02-17 PROCEDURE — 30117 REMOVAL OF INTRANASAL LESION: CPT

## 2022-02-17 DEVICE — FLOSEAL FAST PREP 5ML: Type: IMPLANTABLE DEVICE | Status: FUNCTIONAL

## 2022-02-19 ENCOUNTER — RX RENEWAL (OUTPATIENT)
Age: 32
End: 2022-02-19

## 2022-02-23 ENCOUNTER — APPOINTMENT (OUTPATIENT)
Dept: OTOLARYNGOLOGY | Facility: CLINIC | Age: 32
End: 2022-02-23
Payer: COMMERCIAL

## 2022-02-23 VITALS
OXYGEN SATURATION: 97 % | DIASTOLIC BLOOD PRESSURE: 75 MMHG | HEIGHT: 61 IN | TEMPERATURE: 98 F | BODY MASS INDEX: 21.34 KG/M2 | HEART RATE: 74 BPM | WEIGHT: 113 LBS | SYSTOLIC BLOOD PRESSURE: 133 MMHG

## 2022-02-23 PROCEDURE — 31237 NSL/SINS NDSC SURG BX POLYPC: CPT | Mod: 50,58

## 2022-02-23 PROCEDURE — 99024 POSTOP FOLLOW-UP VISIT: CPT

## 2022-02-23 NOTE — PROCEDURE
[Congested] : congested [Debridement] : debridement  [Bilateral] : bilateral debridement of the nasal cavity [Severe] : severe [Abdulkadir] : on both sides [Removed] : which was removed

## 2022-02-23 NOTE — HISTORY OF PRESENT ILLNESS
[de-identified] : 31 years old female patient with history of Status post Excision of Milly Bullosa, Sinus Ballon Sinu  Plasty.  Turbinate Reduction, Ablation  of Nasal Obstruction .  Patient is present today in the office for nasal debridement.  Bacitracin ointment was inserted into patient nostrils for lubrication

## 2022-02-23 NOTE — REVIEW OF SYSTEMS
[Patient Intake Form Reviewed] : Patient intake form was reviewed [As Noted in HPI] : as noted in HPI [Nasal Congestion] : nasal congestion [Sinus Pressure] : sinus pressure [Negative] : Heme/Lymph

## 2022-02-23 NOTE — REASON FOR VISIT
[Subsequent Evaluation] : a subsequent evaluation for [FreeTextEntry2] : Status post Excision of Milly Bullosa, Sinus Ballon Sinu  Plasty.  Turbinate Reduction, Ablation  of Nasal Obstruction

## 2022-02-23 NOTE — CONSULT LETTER
[Please see my note below.] : Please see my note below. [Consult Closing:] : Thank you very much for allowing me to participate in the care of this patient.  If you have any questions, please do not hesitate to contact me. [Sincerely,] : Sincerely, [FreeTextEntry3] : Kj Gibson MD, FACS\par Professor of Otolaryngology, [facility] [employer] at [facility]\par Director, Center for Sleep Disorders, Department of Otolaryngology, [facility]\par , Head & Neck Service Line, [facility]\par

## 2022-03-02 ENCOUNTER — APPOINTMENT (OUTPATIENT)
Dept: OTOLARYNGOLOGY | Facility: CLINIC | Age: 32
End: 2022-03-02
Payer: COMMERCIAL

## 2022-03-02 VITALS — OXYGEN SATURATION: 96 % | DIASTOLIC BLOOD PRESSURE: 76 MMHG | HEART RATE: 118 BPM | SYSTOLIC BLOOD PRESSURE: 119 MMHG

## 2022-03-02 VITALS — WEIGHT: 113 LBS | TEMPERATURE: 97.8 F | BODY MASS INDEX: 21.34 KG/M2 | HEIGHT: 61 IN

## 2022-03-02 DIAGNOSIS — H69.83 OTHER SPECIFIED DISORDERS OF EUSTACHIAN TUBE, BILATERAL: ICD-10-CM

## 2022-03-02 DIAGNOSIS — J01.90 ACUTE SINUSITIS, UNSPECIFIED: ICD-10-CM

## 2022-03-02 PROCEDURE — 31237 NSL/SINS NDSC SURG BX POLYPC: CPT | Mod: 50,58

## 2022-03-02 PROCEDURE — 99024 POSTOP FOLLOW-UP VISIT: CPT

## 2022-03-02 RX ORDER — ACETAMINOPHEN AND CODEINE 300; 30 MG/1; MG/1
300-30 TABLET ORAL
Qty: 30 | Refills: 0 | Status: COMPLETED | COMMUNITY
Start: 2022-02-16 | End: 2022-03-02

## 2022-03-02 NOTE — CONSULT LETTER
[Please see my note below.] : Please see my note below. [Consult Closing:] : Thank you very much for allowing me to participate in the care of this patient.  If you have any questions, please do not hesitate to contact me. [Sincerely,] : Sincerely, [FreeTextEntry3] : Kj Gibson MD, FACS\par Professor of Otolaryngology, [facility] [employer] at [facility]\par Director, Center for Sleep Disorders, Department of Otolaryngology, [facility]\par , Head & Neck Service Line, [facility]\par

## 2022-03-02 NOTE — REASON FOR VISIT
[Post-Operative Visit] : a post-operative visit [FreeTextEntry2] : Status post Excision of Milly Bullosa, Sinus Balloon Sinus  Plasty.  Turbinate Reduction, Ablation  of Nasal Obstruction

## 2022-03-02 NOTE — HISTORY OF PRESENT ILLNESS
[de-identified] : 31 years old female patient with history of Status post Excision of Milly Bullosa, Sinus Balloon Sinus  Plasty.  Turbinate Reduction, Ablation  of Nasal Obstruction .  Patient is present today in the office for nasal debridement.  Bacitracin ointment was inserted into patient nostrils for lubrication

## 2022-03-02 NOTE — PROCEDURE
[Debridement] : debridement  [Congested] : congested [Bilateral] : bilateral debridement of the nasal cavity [Severe] : severe [Abdulkadir] : on both sides [Removed] : which was removed

## 2022-04-06 ENCOUNTER — APPOINTMENT (OUTPATIENT)
Dept: OTOLARYNGOLOGY | Facility: CLINIC | Age: 32
End: 2022-04-06
Payer: COMMERCIAL

## 2022-04-06 VITALS
TEMPERATURE: 98.2 F | OXYGEN SATURATION: 97 % | BODY MASS INDEX: 21.71 KG/M2 | WEIGHT: 115 LBS | SYSTOLIC BLOOD PRESSURE: 118 MMHG | HEIGHT: 61 IN | HEART RATE: 95 BPM | DIASTOLIC BLOOD PRESSURE: 81 MMHG

## 2022-04-06 DIAGNOSIS — J34.2 DEVIATED NASAL SEPTUM: ICD-10-CM

## 2022-04-06 DIAGNOSIS — J34.3 HYPERTROPHY OF NASAL TURBINATES: ICD-10-CM

## 2022-04-06 PROCEDURE — 31237 NSL/SINS NDSC SURG BX POLYPC: CPT | Mod: 50,58

## 2022-04-06 PROCEDURE — 99024 POSTOP FOLLOW-UP VISIT: CPT

## 2022-04-06 NOTE — PROCEDURE
[Debridement] : debridement  [Congested] : congested [Bilateral] : bilateral debridement of the nasal cavity [Moderate] : moderate [Abdulkadir] : on both sides [Removed] : which was removed

## 2022-04-06 NOTE — HISTORY OF PRESENT ILLNESS
[de-identified] : 31 years old female patient with history of Status post Excision of Milly Bullosa, Sinus Balloon Sinus  Plasty.  Turbinate Reduction, Ablation  of Nasal Obstruction .  Patient is present today in the office for nasal debridement.

## 2022-04-06 NOTE — CONSULT LETTER
[Please see my note below.] : Please see my note below. [Consult Closing:] : Thank you very much for allowing me to participate in the care of this patient.  If you have any questions, please do not hesitate to contact me. [Sincerely,] : Sincerely, [FreeTextEntry3] : Kj Gibson MD, FACS\par Professor of Otolaryngology, [facility] [employer] at [facility]\par Director, Center for Sleep Disorders, Department of Otolaryngology, [facility]\par , Head & Neck Service Line, [facility]\par

## 2022-04-11 ENCOUNTER — APPOINTMENT (OUTPATIENT)
Dept: INTERNAL MEDICINE | Facility: CLINIC | Age: 32
End: 2022-04-11
Payer: COMMERCIAL

## 2022-04-11 DIAGNOSIS — Q89.2 CONGENITAL MALFORMATIONS OF OTHER ENDOCRINE GLANDS: ICD-10-CM

## 2022-04-11 PROCEDURE — 99441: CPT

## 2022-04-19 ENCOUNTER — TRANSCRIPTION ENCOUNTER (OUTPATIENT)
Age: 32
End: 2022-04-19

## 2022-04-29 ENCOUNTER — APPOINTMENT (OUTPATIENT)
Dept: ULTRASOUND IMAGING | Facility: HOSPITAL | Age: 32
End: 2022-04-29
Payer: COMMERCIAL

## 2022-04-29 ENCOUNTER — OUTPATIENT (OUTPATIENT)
Dept: OUTPATIENT SERVICES | Facility: HOSPITAL | Age: 32
LOS: 1 days | End: 2022-04-29
Payer: COMMERCIAL

## 2022-04-29 PROCEDURE — 76536 US EXAM OF HEAD AND NECK: CPT

## 2022-04-29 PROCEDURE — 76536 US EXAM OF HEAD AND NECK: CPT | Mod: 26

## 2022-05-18 ENCOUNTER — APPOINTMENT (OUTPATIENT)
Dept: OTOLARYNGOLOGY | Facility: CLINIC | Age: 32
End: 2022-05-18

## 2022-11-09 ENCOUNTER — APPOINTMENT (OUTPATIENT)
Dept: INTERNAL MEDICINE | Facility: CLINIC | Age: 32
End: 2022-11-09

## 2022-11-09 VITALS
WEIGHT: 117 LBS | TEMPERATURE: 97.2 F | BODY MASS INDEX: 22.09 KG/M2 | HEIGHT: 61 IN | DIASTOLIC BLOOD PRESSURE: 85 MMHG | SYSTOLIC BLOOD PRESSURE: 129 MMHG | OXYGEN SATURATION: 97 % | HEART RATE: 92 BPM

## 2022-11-09 DIAGNOSIS — D17.1 BENIGN LIPOMATOUS NEOPLASM OF SKIN AND SUBCUTANEOUS TISSUE OF TRUNK: ICD-10-CM

## 2022-11-09 PROCEDURE — 99213 OFFICE O/P EST LOW 20 MIN: CPT

## 2022-11-09 RX ORDER — METHYLPREDNISOLONE 4 MG/1
4 TABLET ORAL
Qty: 1 | Refills: 0 | Status: DISCONTINUED | COMMUNITY
Start: 2022-02-16 | End: 2022-11-09

## 2022-11-09 RX ORDER — AZITHROMYCIN 250 MG/1
250 TABLET, FILM COATED ORAL
Qty: 1 | Refills: 0 | Status: DISCONTINUED | COMMUNITY
Start: 2022-02-16 | End: 2022-11-09

## 2022-11-09 NOTE — HISTORY OF PRESENT ILLNESS
[FreeTextEntry8] : Ms. STARLA PLATT is a 32 year old female presenting today for concern about nodule on her chest. First noticed it while and [place] and attributed it to a bug bite. First noticed at the beginning of September and has not changed. No redness, warmth, drainage or changes to the area.

## 2022-11-09 NOTE — REVIEW OF SYSTEMS
[Negative] : Musculoskeletal [Itching] : no itching [Mole Changes] : no mole changes [Skin Rash] : no skin rash [de-identified] : lump on upper chest

## 2022-11-09 NOTE — PHYSICAL EXAM
[Normal Sclera/Conjunctiva] : normal sclera/conjunctiva [No Respiratory Distress] : no respiratory distress  [No Accessory Muscle Use] : no accessory muscle use [Normal Rate] : normal rate  [No Rash] : no rash [Normal] : affect was normal and insight and judgment were intact [de-identified] : <1cm mobile SQ nodule

## 2023-10-04 ENCOUNTER — APPOINTMENT (OUTPATIENT)
Dept: INTERNAL MEDICINE | Facility: CLINIC | Age: 33
End: 2023-10-04
Payer: COMMERCIAL

## 2023-10-04 ENCOUNTER — APPOINTMENT (OUTPATIENT)
Dept: INTERNAL MEDICINE | Facility: CLINIC | Age: 33
End: 2023-10-04

## 2023-10-04 VITALS
BODY MASS INDEX: 22.09 KG/M2 | SYSTOLIC BLOOD PRESSURE: 123 MMHG | WEIGHT: 117 LBS | DIASTOLIC BLOOD PRESSURE: 85 MMHG | HEART RATE: 82 BPM | TEMPERATURE: 98.2 F | OXYGEN SATURATION: 98 % | HEIGHT: 61 IN

## 2023-10-04 DIAGNOSIS — F32.A ANXIETY DISORDER, UNSPECIFIED: ICD-10-CM

## 2023-10-04 DIAGNOSIS — F41.9 ANXIETY DISORDER, UNSPECIFIED: ICD-10-CM

## 2023-10-04 DIAGNOSIS — J06.9 ACUTE UPPER RESPIRATORY INFECTION, UNSPECIFIED: ICD-10-CM

## 2023-10-04 PROCEDURE — 99213 OFFICE O/P EST LOW 20 MIN: CPT

## 2024-01-10 ENCOUNTER — APPOINTMENT (OUTPATIENT)
Dept: INTERNAL MEDICINE | Facility: CLINIC | Age: 34
End: 2024-01-10

## (undated) DEVICE — PACK RHINOPLASTY

## (undated) DEVICE — DRAPE TOWEL BLUE 17" X 24"

## (undated) DEVICE — ELCTR BOVIE SUCTION 8FR 6"

## (undated) DEVICE — GLV 6.5 PROTEXIS (WHITE)

## (undated) DEVICE — SYR LUER LOK 5CC

## (undated) DEVICE — SUT CHROMIC 4-0 18" PS-2

## (undated) DEVICE — PETRI DISH MED 3.5"

## (undated) DEVICE — GLV 7.5 PROTEXIS (WHITE)

## (undated) DEVICE — SUT CHROMIC 2-0 27" SH

## (undated) DEVICE — WARMING BLANKET LOWER ADULT

## (undated) DEVICE — GLV 6.5 PROTEXIS W HYDROGEL

## (undated) DEVICE — COTTONBALL LG

## (undated) DEVICE — VENODYNE/SCD SLEEVE CALF MEDIUM

## (undated) DEVICE — APPLICATOR FOR FLOSEAL

## (undated) DEVICE — PACK TONSIL ADENOID

## (undated) DEVICE — TUBING RAPIDVAC SMOKE EVACUATOR .25" X 10FT

## (undated) DEVICE — GOWN ROYAL SILK XL

## (undated) DEVICE — SOL ANTI FOG

## (undated) DEVICE — SLV COMPRESSION KNEE MED